# Patient Record
Sex: FEMALE | URBAN - METROPOLITAN AREA
[De-identification: names, ages, dates, MRNs, and addresses within clinical notes are randomized per-mention and may not be internally consistent; named-entity substitution may affect disease eponyms.]

---

## 2019-05-14 ENCOUNTER — OFFICE VISIT (OUTPATIENT)
Dept: NEUROLOGY | Facility: CLINIC | Age: 19
End: 2019-05-14
Payer: COMMERCIAL

## 2019-05-14 VITALS
HEIGHT: 62 IN | HEART RATE: 68 BPM | BODY MASS INDEX: 20.98 KG/M2 | SYSTOLIC BLOOD PRESSURE: 110 MMHG | RESPIRATION RATE: 16 BRPM | DIASTOLIC BLOOD PRESSURE: 61 MMHG | WEIGHT: 114 LBS

## 2019-05-14 DIAGNOSIS — R56.9 NEW ONSET SEIZURE (HCC): Primary | ICD-10-CM

## 2019-05-14 PROCEDURE — 99204 OFFICE O/P NEW MOD 45 MIN: CPT | Performed by: OTHER

## 2019-05-14 RX ORDER — DEXTROAMPHETAMINE SACCHARATE, AMPHETAMINE ASPARTATE MONOHYDRATE, DEXTROAMPHETAMINE SULFATE AND AMPHETAMINE SULFATE 7.5; 7.5; 7.5; 7.5 MG/1; MG/1; MG/1; MG/1
30 CAPSULE, EXTENDED RELEASE ORAL EVERY MORNING
COMMUNITY

## 2019-05-14 RX ORDER — SPIRONOLACTONE 25 MG/1
25 TABLET ORAL DAILY
COMMUNITY

## 2019-05-14 RX ORDER — NORGESTIMATE AND ETHINYL ESTRADIOL 7DAYSX3 28
KIT ORAL
COMMUNITY

## 2019-05-14 NOTE — H&P
[facility] New Patient / Consult Visit    Chalino Hernandez is a 22year old female. Referring MD: No ref.  provider found    Patient presents with:  Seizures      HPI:    Chalino Hernandez is a 22year old, w • Colon Cancer Maternal Grandfather        Allergies:  Not on File   Current Meds:    Current Outpatient Medications:  Amphetamine-Dextroamphet ER 30 MG Oral Capsule SR 24 Hr Take 30 mg by mouth every morning.  Disp:  Rfl:    spironolactone 25 MG Oral Tab with midline uvula  Spinal accessory:   Shoulder Shrug: normal bilaterally   Lateral head turn: normal bilaterally  Hypoglossal:   Tongue movement: protrusion is midline with normal lateral movements    Motor System:  Strength: 5/5 throughout  Tone: normal Noted above     45 total minutes spent with patient >50% of visit was spent in counseling and coordination of care, including discussion of seizure diagnosis, indications for anti-seizure medications, discussion of potential side effects and risk of subseq

## (undated) NOTE — LETTER
05/17/19    Dear Dr. Allyn Watson,       I saw your patient, Ita Cano for a follow up visit. Please see my progress note enclosed below. Let me know if you have any questions.     Thank you  Alberto Benavides MD, Neurology  [facility] incontinence or mood issues.      Past Medical History:   Diagnosis Date   • ADD (attention deficit disorder)      Past Surgical History:   Procedure Laterality Date   • HAND/FINGER SURGERY UNLISTED      RT pinky     Social History    Tobacco Use      [employer]:    Pupils: equally round and reactive to light with direct and consensual responses, normal accomodation   Visual acuity: Normal              Visual fields: Normal  Oculomotor/Trochlear/Abducens:    Eye Movements: EOMI without nystagmus  Trigeminal: Patient is going out of town to [facility] tomorrow and then returning 6/5, then going back out to [place], [place] - then to [facility] ~8/24 - will send notes to PCP DR WIL PENNINGTON [facility] regarding recommend